# Patient Record
(demographics unavailable — no encounter records)

---

## 2025-01-29 NOTE — CARDIOLOGY SUMMARY
[LVSF ___%] : LV Shortening Fraction [unfilled]% [de-identified] : 1/23/25 [FreeTextEntry1] : Normal sinus rhythm @ 61 bpm  ND: 136 ms QRS: 94 ms  QTc: 396-404 ms P-R-T Axis (53-88-62) Early repolarization Normal voltage and intervals No pre-excitation  [de-identified] : 1/23/25 [FreeTextEntry2] : A complete 2D, M-mode, doppler and color flow doppler transthoracic pediatric echocardiogram was performed. The intracardiac anatomy and doppler flow profiles were otherwise normal appearing with the following:   Summary: 1. Ascending aorta appears bulbous with a diameter that is larger than the aortic root but within normal limits. 2. Mild buckling of the anterior mitral valve leaflet. 3. Trivial mitral valve regurgitation. 4. Trace aortic valve regurgitation. 5. Mild tricuspid valve regurgitation, peak systolic instantaneous gradient 14.9 mmHg. 6. Trivial pulmonary valve regurgitation. 7. Normal right ventricular morphology with qualitatively normal size and systolic function. 8. Normal left ventricular size, morphology and systolic function. 9. No pericardial effusion.

## 2025-01-29 NOTE — REASON FOR VISIT
[Follow-Up] : a follow-up visit for [Family History] : family history [Patient] : patient [Mother] : mother [FreeTextEntry1] : Bicuspid Aortic Valve [FreeTextEntry3] : Bicuspid Aortic Valve-Mom

## 2025-01-29 NOTE — CONSULT LETTER
[Today's Date] : [unfilled] [Name] : Name: [unfilled] [] : : ~~ [Today's Date:] : [unfilled] [Dear  ___:] : Dear Dr. [unfilled]: [Consult] : I had the pleasure of evaluating your patient, [unfilled]. My full evaluation follows. [Consult - Single Provider] : Thank you very much for allowing me to participate in the care of this patient. If you have any questions, please do not hesitate to contact me. [Sincerely,] : Sincerely, [FreeTextEntry4] : Hemal Mcfadden, DO [FreeTextEntry5] : [address] [facility] [place] [FreeTextEntry6] : [place]  [address] [de-identified] : Humphrey Vallejo DO MPH\par  Pediatric Cardiology\par  [facility]\par

## 2025-01-29 NOTE — CLINICAL NARRATIVE
[Up to Date] : Up to Date [FreeTextEntry1] : as per patient [FreeTextEntry2] : Covid Vaccine and Boostered

## 2025-01-29 NOTE — CONSULT LETTER
[Today's Date] : [unfilled] [Name] : Name: [unfilled] [] : : ~~ [Today's Date:] : [unfilled] [Dear  ___:] : Dear Dr. [unfilled]: [Consult] : I had the pleasure of evaluating your patient, [unfilled]. My full evaluation follows. [Consult - Single Provider] : Thank you very much for allowing me to participate in the care of this patient. If you have any questions, please do not hesitate to contact me. [Sincerely,] : Sincerely, [FreeTextEntry4] : Hemal Mcfadden, DO [FreeTextEntry5] : [address] [facility] [FreeTextEntry6] : [place]  [address] [de-identified] : Humphrey Vallejo DO MPH\par  Pediatric Cardiology\par  [facility]\par

## 2025-01-29 NOTE — CARDIOLOGY SUMMARY
[LVSF ___%] : LV Shortening Fraction [unfilled]% [de-identified] : 1/23/25 [FreeTextEntry1] : Normal sinus rhythm @ 61 bpm  IN: 136 ms QRS: 94 ms  QTc: 396-404 ms P-R-T Axis (53-88-62) Early repolarization Normal voltage and intervals No pre-excitation  [de-identified] : 1/23/25 [FreeTextEntry2] : A complete 2D, M-mode, doppler and color flow doppler transthoracic pediatric echocardiogram was performed. The intracardiac anatomy and doppler flow profiles were otherwise normal appearing with the following:   Summary: 1. Ascending aorta appears bulbous with a diameter that is larger than the aortic root but within normal limits. 2. Mild buckling of the anterior mitral valve leaflet. 3. Trivial mitral valve regurgitation. 4. Trace aortic valve regurgitation. 5. Mild tricuspid valve regurgitation, peak systolic instantaneous gradient 14.9 mmHg. 6. Trivial pulmonary valve regurgitation. 7. Normal right ventricular morphology with qualitatively normal size and systolic function. 8. Normal left ventricular size, morphology and systolic function. 9. No pericardial effusion.

## 2025-01-29 NOTE — HISTORY OF PRESENT ILLNESS
[FreeTextEntry1] : Smith is a well appearing, active 19 year old who presents for a follow-up evaluation  of his mitral valve buckling, tricuspid regurgitation and ascending aorta in the setting of a family history of bicuspid aortic valve with aortopathy. He presents today doing well without reported symptoms or concerns referable to his cardiovascular system.   There has been no chest pain, palpitations, shortness of breath, dizziness, lightheadedness, syncope or near syncope.  He plays soccer and basketball and has good exercise endurance. There has been no history of exercise induced symptoms nor recent changes in exercise tolerance or activity levels.  Good appetite, remaining well hydrated. Normal urine output. No reported concerns with growth or development.  There has been no recent fevers, illnesses or hospitalizations. No known sick contacts.    Fam Hx Mom has a h/o bicuspid aortic valve s/p Ross procedure and ascending aorta plasty for thoracic aneurysm and bicuspid aortic valve stenosis.  MGM: BAV w/ stenosis (s/p valve replacement 70's) Sister: Bicuspid Aortic valve  No additionally reported family history of an arrhythmia, aortic aneurysm, unexplained death,  congenital heart disease, cardiomyopathy or sudden cardiac death

## 2025-01-29 NOTE — DISCUSSION/SUMMARY
[PE + No Restrictions] : [unfilled] may participate in the entire physical education program without restriction, including all varsity competitive sports. [FreeTextEntry1] : STEFFANY  is a well appearing, active 19 year old who presents without identified concerns for significant structural heart disease nor impaired cardiac output by his  past medical history nor on his  current physical exam.  -Subtle early repolarization on resting EKG; no correlating clinical concerns.   -Ongoing mild tricuspid, trivial pulmonary in otherwise well functioning valves. This was not audible on physical exam and will follow longitudinally.   -There remains mild buckling of the anterior mitral valve leaflet with stable trivial regurgitation in an otherwise well functioning valve. Given how well the valve functions I would not expect clinical symptoms at this time. Given the potential for disease progression to overt prolapse; longitudinal surveillance is recommended.   -His Aortic valve appears trileaflet and functioning well. Today appreciate trace regurgitation; no stenosis.   -His ascending aorta diameter measures within normal limits but remains slightly larger than the aortic root. This may represent evolving aortic disease and longitudinal surveillance is recommended.  -I explained to his parent that in the setting of familial bicuspid aortic valve there may be an associated inheritable aortopathy even in the setting of a normal aortic valve. His echocardiogram is reassuring today but I explained this does not rule out the potential for aortic dilation developing in the future. Healthy lifestyles, maintaining normal blood pressure and avoidance of smoking are important. I also recommended screening of first degree relatives.   -Systolic blood pressure within normal limits during our evaluation today.    I recommended one year follow up and we reviewed signs and symptoms that should prompt medical attention and sooner evaluation. STEFFANY and family verbalized their understanding and all questions were answered.  [Needs SBE Prophylaxis] : [unfilled] does not need bacterial endocarditis prophylaxis